# Patient Record
Sex: FEMALE | Race: OTHER | Employment: OTHER | URBAN - METROPOLITAN AREA
[De-identification: names, ages, dates, MRNs, and addresses within clinical notes are randomized per-mention and may not be internally consistent; named-entity substitution may affect disease eponyms.]

---

## 2020-02-28 ENCOUNTER — NEW PATIENT COMPREHENSIVE (OUTPATIENT)
Dept: URBAN - METROPOLITAN AREA CLINIC 47 | Facility: CLINIC | Age: 70
End: 2020-02-28

## 2020-02-28 DIAGNOSIS — H52.7: ICD-10-CM

## 2020-02-28 PROCEDURE — 92015 DETERMINE REFRACTIVE STATE: CPT

## 2020-02-28 PROCEDURE — 92004 COMPRE OPH EXAM NEW PT 1/>: CPT

## 2020-02-28 ASSESSMENT — VISUAL ACUITY
OS_SC: 20/70-1
OS_CC: 20/25-2
OD_CC: 20/25-2
OD_CC: J2
OD_SC: 20/80-1
OS_CC: J2

## 2020-02-28 ASSESSMENT — TONOMETRY
OD_IOP_MMHG: 17
OS_IOP_MMHG: 17

## 2021-05-04 NOTE — PATIENT DISCUSSION
The patient was informed that with [address] [facility] for distance, they will need glasses for all near and intermediate activities after surgery. The patient understands there is a possibility they may need an enhancement after surgery. The patient elects [employer] OD, goal of emmetropia.

## 2021-05-18 NOTE — PATIENT DISCUSSION
The patient was informed that with [address] [facility] for distance, they will need glasses for all near and intermediate activities after surgery. The patient understands there is a possibility they may need an enhancement after surgery. The patient elects [employer] OD, goal of emmetropia.

## 2021-05-18 NOTE — PATIENT DISCUSSION
The patient was informed that with [address] [facility] for distance, they will need glasses for all near and intermediate activities after surgery. The patient understands there is a possibility they may need an enhancement after surgery. The patient elects [employer] OD, goal of emmetropia.

## 2021-05-19 NOTE — PATIENT DISCUSSION
Cataract surgery has been performed in the first eye and activities of daily living are still impaired. The patient would like to proceed with cataract surgery in the second eye as scheduled. The patient elects [employer] [facility] OS, goal josé miguel. Patient understands will need glasses for any tasks within arms length. (including dashboard in car).

## 2021-05-28 ENCOUNTER — ESTABLISHED COMPREHENSIVE EXAM (OUTPATIENT)
Dept: URBAN - METROPOLITAN AREA CLINIC 43 | Facility: CLINIC | Age: 71
End: 2021-05-28

## 2021-05-28 DIAGNOSIS — H25.812: ICD-10-CM

## 2021-05-28 DIAGNOSIS — H25.811: ICD-10-CM

## 2021-05-28 DIAGNOSIS — H52.7: ICD-10-CM

## 2021-05-28 PROCEDURE — 92015 DETERMINE REFRACTIVE STATE: CPT

## 2021-05-28 PROCEDURE — 92014 COMPRE OPH EXAM EST PT 1/>: CPT

## 2021-05-28 RX ORDER — OLOPATADINE HYDROCHLORIDE 2 MG/ML: 1 SOLUTION OPHTHALMIC ONCE A DAY

## 2021-05-28 ASSESSMENT — VISUAL ACUITY
OS_SC: >J12
OD_SC: 20/100
OS_SC: 20/100
OD_SC: >J12
OD_CC: 20/30+1
OS_CC: 20/25-2
OD_CC: J1
OS_CC: J1

## 2021-05-28 ASSESSMENT — TONOMETRY
OS_IOP_MMHG: 17
OD_IOP_MMHG: 17

## 2022-04-29 ENCOUNTER — COMPREHENSIVE EXAM (OUTPATIENT)
Dept: URBAN - METROPOLITAN AREA CLINIC 43 | Facility: CLINIC | Age: 72
End: 2022-04-29

## 2022-04-29 DIAGNOSIS — H52.7: ICD-10-CM

## 2022-04-29 PROCEDURE — 92015 DETERMINE REFRACTIVE STATE: CPT

## 2022-04-29 PROCEDURE — 92014 COMPRE OPH EXAM EST PT 1/>: CPT

## 2022-04-29 ASSESSMENT — VISUAL ACUITY
OS_SC: 20/80-1
OD_BAT: 20/400
OD_CC: 20/40
OD_CC: J8
OS_BAT: 20/100
OD_SC: 20/70-2
OS_CC: 20/30-1
OS_CC: J6

## 2022-04-29 ASSESSMENT — TONOMETRY
OD_IOP_MMHG: 19
OS_IOP_MMHG: 20

## 2023-06-23 ENCOUNTER — COMPREHENSIVE EXAM (OUTPATIENT)
Dept: URBAN - METROPOLITAN AREA CLINIC 43 | Facility: CLINIC | Age: 73
End: 2023-06-23

## 2023-06-23 DIAGNOSIS — H04.123: ICD-10-CM

## 2023-06-23 DIAGNOSIS — H25.813: ICD-10-CM

## 2023-06-23 DIAGNOSIS — H52.7: ICD-10-CM

## 2023-06-23 DIAGNOSIS — H18.513: ICD-10-CM

## 2023-06-23 PROCEDURE — 92015 DETERMINE REFRACTIVE STATE: CPT

## 2023-06-23 PROCEDURE — 92014 COMPRE OPH EXAM EST PT 1/>: CPT

## 2023-06-23 ASSESSMENT — VISUAL ACUITY
OD_BAT: 20/400
OD_CC: J1
OD_SC: 20/60
OS_SC: 20/70
OD_SC: J12
OS_CC: 20/30-2
OS_SC: J12
OS_BAT: 20/100
OD_CC: 20/30-1
OS_CC: J1

## 2023-06-23 ASSESSMENT — TONOMETRY
OS_IOP_MMHG: 19
OD_IOP_MMHG: 18

## 2024-06-21 ENCOUNTER — COMPREHENSIVE EXAM (OUTPATIENT)
Dept: URBAN - METROPOLITAN AREA CLINIC 43 | Facility: CLINIC | Age: 74
End: 2024-06-21

## 2024-06-21 DIAGNOSIS — H04.123: ICD-10-CM

## 2024-06-21 DIAGNOSIS — H52.7: ICD-10-CM

## 2024-06-21 DIAGNOSIS — H25.813: ICD-10-CM

## 2024-06-21 DIAGNOSIS — H18.513: ICD-10-CM

## 2024-06-21 DIAGNOSIS — H43.811: ICD-10-CM

## 2024-06-21 PROCEDURE — 92014 COMPRE OPH EXAM EST PT 1/>: CPT

## 2024-06-21 ASSESSMENT — VISUAL ACUITY
OD_SC: J12
OS_CC: 20/40+1
OS_BAT: 20/200
OS_CC: J2-
OD_BAT: 20/400
OS_SC: J12
OD_CC: J2-
OD_CC: 20/40

## 2024-06-21 ASSESSMENT — TONOMETRY
OD_IOP_MMHG: 18
OS_IOP_MMHG: 18

## 2025-07-08 ENCOUNTER — COMPREHENSIVE EXAM (OUTPATIENT)
Age: 75
End: 2025-07-08

## 2025-07-08 DIAGNOSIS — H04.123: ICD-10-CM

## 2025-07-08 DIAGNOSIS — H25.813: ICD-10-CM

## 2025-07-08 DIAGNOSIS — H52.7: ICD-10-CM

## 2025-07-08 DIAGNOSIS — H18.513: ICD-10-CM

## 2025-07-08 PROCEDURE — 92015 DETERMINE REFRACTIVE STATE: CPT

## 2025-07-08 PROCEDURE — 92014 COMPRE OPH EXAM EST PT 1/>: CPT
